# Patient Record
Sex: MALE | Race: WHITE | NOT HISPANIC OR LATINO
[De-identification: names, ages, dates, MRNs, and addresses within clinical notes are randomized per-mention and may not be internally consistent; named-entity substitution may affect disease eponyms.]

---

## 2019-11-27 ENCOUNTER — APPOINTMENT (OUTPATIENT)
Dept: UROLOGY | Facility: CLINIC | Age: 79
End: 2019-11-27
Payer: MEDICARE

## 2019-11-27 VITALS
DIASTOLIC BLOOD PRESSURE: 71 MMHG | HEART RATE: 76 BPM | HEIGHT: 67 IN | BODY MASS INDEX: 32.49 KG/M2 | WEIGHT: 207 LBS | SYSTOLIC BLOOD PRESSURE: 116 MMHG

## 2019-11-27 DIAGNOSIS — Z87.448 PERSONAL HISTORY OF OTHER DISEASES OF URINARY SYSTEM: ICD-10-CM

## 2019-11-27 DIAGNOSIS — E11.9 TYPE 2 DIABETES MELLITUS W/OUT COMPLICATIONS: ICD-10-CM

## 2019-11-27 DIAGNOSIS — Z78.9 OTHER SPECIFIED HEALTH STATUS: ICD-10-CM

## 2019-11-27 DIAGNOSIS — Z87.438 PERSONAL HISTORY OF OTHER DISEASES OF MALE GENITAL ORGANS: ICD-10-CM

## 2019-11-27 DIAGNOSIS — Z87.891 PERSONAL HISTORY OF NICOTINE DEPENDENCE: ICD-10-CM

## 2019-11-27 PROCEDURE — 99214 OFFICE O/P EST MOD 30 MIN: CPT | Mod: 25

## 2019-11-27 PROCEDURE — 51798 US URINE CAPACITY MEASURE: CPT

## 2019-11-27 RX ORDER — DIGOXIN 0.06 MG/1
TABLET ORAL
Refills: 0 | Status: ACTIVE | COMMUNITY

## 2019-11-27 RX ORDER — CANDESARTAN CILEXETIL 32 MG/1
TABLET ORAL
Refills: 0 | Status: ACTIVE | COMMUNITY

## 2019-11-27 RX ORDER — FUROSEMIDE 20 MG/1
20 TABLET ORAL
Refills: 0 | Status: ACTIVE | COMMUNITY

## 2019-11-27 RX ORDER — OMEPRAZOLE, SODIUM BICARBONATE 20; 1680 MG/1; MG/1
20-1680 POWDER, FOR SUSPENSION ORAL
Refills: 0 | Status: ACTIVE | COMMUNITY

## 2019-11-27 RX ORDER — SITAGLIPTIN 50 MG/1
50 TABLET, FILM COATED ORAL
Refills: 0 | Status: ACTIVE | COMMUNITY

## 2019-11-27 RX ORDER — GLIPIZIDE 10 MG/1
10 TABLET ORAL
Refills: 0 | Status: ACTIVE | COMMUNITY

## 2019-11-27 RX ORDER — ATORVASTATIN CALCIUM 40 MG/1
40 TABLET, FILM COATED ORAL
Refills: 0 | Status: ACTIVE | COMMUNITY

## 2019-11-27 RX ORDER — TAMSULOSIN HYDROCHLORIDE 0.4 MG/1
0.4 CAPSULE ORAL
Refills: 0 | Status: ACTIVE | COMMUNITY

## 2019-11-27 RX ORDER — FERROUS SULFATE 220 (44)/5
220 (44 FE) SOLUTION, ORAL ORAL
Refills: 0 | Status: ACTIVE | COMMUNITY

## 2019-11-27 RX ORDER — APIXABAN 5 MG/1
TABLET, FILM COATED ORAL
Refills: 0 | Status: ACTIVE | COMMUNITY

## 2019-11-27 NOTE — LETTER BODY
[Dear  ___] : Dear  [unfilled], [Consult Letter:] : I had the pleasure of evaluating your patient, [unfilled]. [Please see my note below.] : Please see my note below. [Consult Closing:] : Thank you very much for allowing me to participate in the care of this patient.  If you have any questions, please do not hesitate to contact me. [Sincerely,] : Sincerely, [FreeTextEntry3] : Andrew Torres MD

## 2019-11-27 NOTE — ASSESSMENT
[FreeTextEntry1] : Continue tamsulosin. Patient will try tadalafil, 20mg as needed. He will come back in one month - if has suboptimal response, will have a trial of intracavernosal PGE1 injection

## 2019-11-27 NOTE — REVIEW OF SYSTEMS
[Poor quality erections] : Poor quality erections [Wake up at night to urinate  How many times?  ___] : wakes up to urinate [unfilled] times during the night [Leakage of urine with urgency] : leakage of urine with urgency [Negative] : Heme/Lymph [Nocturia] : nocturia [Urine Infection (bladder/kidney)] : denies bladder/kidney infections [Pain during urination] : denies pain during urination [Pain at onset of urination] : denies pain during onset of urination [Pain after urination] : denies pain after urination [Blood in urine that you can see] : denies seeing blood in urine [Told you have blood in urine on a urine test] : denies being told that blood was present in a urine test [Discharge from urine canal] : denies discharge from urine canal [History of kidney stones] : denies history of kidney stones [Urine retention] : denies urine retention [Strong urge to urinate] : denies strong urge to urinate [Bladder pressure] : denies bladder pressure [Strain or push to urinate] : denies straining or pushing to urinate [Wait a long time to urinate] : denies waiting a long time to urinate [Slow urine stream] : denies slow urine stream [Interrupted urine stream] : denies interrupted urine stream [Bladder fullness after urinating] : denies bladder fullness after urinating [Increased pain/discomfort with bladder filling] : denies increased pain/discomfort with bladder filling [Bladder problems as child. If yes, describe..] : denies bladder problems as child

## 2019-11-27 NOTE — HISTORY OF PRESENT ILLNESS
[FreeTextEntry1] : Patient is without new voiding complaints. He is voiding with a good stream, mild frequency and nocturia x2-3, but no dysuria, hematuria or incontinence. He is reporting severe erectile dysfunction despite normal libido. He tried Viagra many years ago without significant response

## 2019-11-27 NOTE — PHYSICAL EXAM
[General Appearance - Well Developed] : well developed [General Appearance - Well Nourished] : well nourished [Normal Appearance] : normal appearance [Well Groomed] : well groomed [General Appearance - In No Acute Distress] : no acute distress [Abdomen Soft] : soft [Abdomen Tenderness] : non-tender [Costovertebral Angle Tenderness] : no ~M costovertebral angle tenderness [Urethral Meatus] : meatus normal [Penis Abnormality] : normal uncircumcised penis [Urinary Bladder Findings] : the bladder was normal on palpation [Scrotum] : the scrotum was normal [Testes Mass (___cm)] : there were no testicular masses [No Prostate Nodules] : no prostate nodules [Prostate Size ___ (0-4)] : prostate size [unfilled] (scale: 0-4) [Edema] : no peripheral edema [] : no respiratory distress [Respiration, Rhythm And Depth] : normal respiratory rhythm and effort [Exaggerated Use Of Accessory Muscles For Inspiration] : no accessory muscle use [Oriented To Time, Place, And Person] : oriented to person, place, and time [Affect] : the affect was normal [Mood] : the mood was normal [Not Anxious] : not anxious [Normal Station and Gait] : the gait and station were normal for the patient's age [No Focal Deficits] : no focal deficits [No Palpable Adenopathy] : no palpable adenopathy

## 2019-12-23 ENCOUNTER — APPOINTMENT (OUTPATIENT)
Dept: UROLOGY | Facility: CLINIC | Age: 79
End: 2019-12-23
Payer: MEDICARE

## 2019-12-23 VITALS — SYSTOLIC BLOOD PRESSURE: 145 MMHG | DIASTOLIC BLOOD PRESSURE: 76 MMHG | HEART RATE: 98 BPM

## 2019-12-23 DIAGNOSIS — N40.1 BENIGN PROSTATIC HYPERPLASIA WITH LOWER URINARY TRACT SYMPMS: ICD-10-CM

## 2019-12-23 DIAGNOSIS — N52.02 CORPORO-VENOUS OCCLUSIVE ERECTILE DYSFUNCTION: ICD-10-CM

## 2019-12-23 PROCEDURE — 99213 OFFICE O/P EST LOW 20 MIN: CPT | Mod: 25

## 2019-12-23 PROCEDURE — 54235 NJX CORPORA CAVERNOSA RX AGT: CPT

## 2019-12-23 NOTE — REVIEW OF SYSTEMS
[Wake up at night to urinate  How many times?  ___] : wakes up to urinate [unfilled] times during the night [Leakage of urine with urgency] : leakage of urine with urgency [Negative] : Heme/Lymph [Urine Infection (bladder/kidney)] : denies bladder/kidney infections [Pain at onset of urination] : denies pain during onset of urination [Pain after urination] : denies pain after urination [Pain during urination] : denies pain during urination [Blood in urine that you can see] : denies seeing blood in urine [Told you have blood in urine on a urine test] : denies being told that blood was present in a urine test [History of kidney stones] : denies history of kidney stones [Discharge from urine canal] : denies discharge from urine canal [Urine retention] : denies urine retention [Strong urge to urinate] : denies strong urge to urinate [Bladder pressure] : denies bladder pressure [Strain or push to urinate] : denies straining or pushing to urinate [Wait a long time to urinate] : denies waiting a long time to urinate [Interrupted urine stream] : denies interrupted urine stream [Slow urine stream] : denies slow urine stream [Bladder fullness after urinating] : denies bladder fullness after urinating [Increased pain/discomfort with bladder filling] : denies increased pain/discomfort with bladder filling [Leakage of urine with straining, coughing, laughing] : denies leakage of urine with straining, coughing, and/or laughing [Bladder problems as child. If yes, describe..] : denies bladder problems as child [Unaware of when urine is leaking] : aware of when urine is leaking

## 2019-12-23 NOTE — HISTORY OF PRESENT ILLNESS
[FreeTextEntry1] : Patient is without new voiding complaints. He had minimal response to tadalafil and presents for trial of penile injection

## 2019-12-23 NOTE — PHYSICAL EXAM
[General Appearance - Well Nourished] : well nourished [General Appearance - Well Developed] : well developed [Normal Appearance] : normal appearance [General Appearance - In No Acute Distress] : no acute distress [Well Groomed] : well groomed [Abdomen Tenderness] : non-tender [Abdomen Soft] : soft [Costovertebral Angle Tenderness] : no ~M costovertebral angle tenderness [Penis Abnormality] : normal uncircumcised penis [Urethral Meatus] : meatus normal [Urinary Bladder Findings] : the bladder was normal on palpation [Scrotum] : the scrotum was normal [Testes Mass (___cm)] : there were no testicular masses [No Prostate Nodules] : no prostate nodules [Edema] : no peripheral edema [] : no respiratory distress [Respiration, Rhythm And Depth] : normal respiratory rhythm and effort [Exaggerated Use Of Accessory Muscles For Inspiration] : no accessory muscle use [Oriented To Time, Place, And Person] : oriented to person, place, and time [Affect] : the affect was normal [Mood] : the mood was normal [Not Anxious] : not anxious [Normal Station and Gait] : the gait and station were normal for the patient's age [No Focal Deficits] : no focal deficits [No Palpable Adenopathy] : no palpable adenopathy

## 2019-12-23 NOTE — ASSESSMENT
[FreeTextEntry1] : PGE1, 20mcg, injected intracorporally with prompt tumescence and partial rigidity at 5 minutes.  Patient will call on 12/27/19 with report on complete response to penile injection (he was advised to come back or present to an emergency room if erection persists for more than 4 hours) - if he is satisfied, he will go on self-injection protocol.

## 2019-12-30 ENCOUNTER — MEDICATION RENEWAL (OUTPATIENT)
Age: 79
End: 2019-12-30

## 2019-12-30 RX ORDER — TADALAFIL 20 MG/1
20 TABLET ORAL
Qty: 6 | Refills: 6 | Status: ACTIVE | COMMUNITY
Start: 2019-11-27 | End: 1900-01-01

## 2019-12-31 RX ORDER — ALPROSTADIL 20 UG/ML
20 INJECTION, POWDER, LYOPHILIZED, FOR SOLUTION INTRACAVERNOUS
Qty: 6 | Refills: 5 | Status: ACTIVE | COMMUNITY
Start: 2019-12-31 | End: 1900-01-01

## 2020-01-18 ENCOUNTER — CLINICAL ADVICE (OUTPATIENT)
Age: 80
End: 2020-01-18

## 2023-12-13 ENCOUNTER — NON-APPOINTMENT (OUTPATIENT)
Age: 83
End: 2023-12-13

## 2024-04-02 ENCOUNTER — NON-APPOINTMENT (OUTPATIENT)
Age: 84
End: 2024-04-02

## 2024-04-03 DIAGNOSIS — M79.672 PAIN IN RIGHT FOOT: ICD-10-CM

## 2024-04-03 DIAGNOSIS — Z86.79 PERSONAL HISTORY OF OTHER DISEASES OF THE CIRCULATORY SYSTEM: ICD-10-CM

## 2024-04-03 DIAGNOSIS — Z86.39 PERSONAL HISTORY OF OTHER ENDOCRINE, NUTRITIONAL AND METABOLIC DISEASE: ICD-10-CM

## 2024-04-03 DIAGNOSIS — M79.675 PAIN IN LEFT TOE(S): ICD-10-CM

## 2024-04-03 DIAGNOSIS — M79.671 PAIN IN RIGHT FOOT: ICD-10-CM

## 2024-04-03 DIAGNOSIS — L84 CORNS AND CALLOSITIES: ICD-10-CM

## 2024-04-26 ENCOUNTER — APPOINTMENT (OUTPATIENT)
Dept: PODIATRY | Facility: CLINIC | Age: 84
End: 2024-04-26
Payer: MEDICARE

## 2024-04-26 DIAGNOSIS — L60.0 INGROWING NAIL: ICD-10-CM

## 2024-04-26 DIAGNOSIS — M79.674 PAIN IN RIGHT TOE(S): ICD-10-CM

## 2024-04-26 PROCEDURE — 99212 OFFICE O/P EST SF 10 MIN: CPT | Mod: 25

## 2024-04-26 PROCEDURE — 11765 WEDGE EXCISION SKN NAIL FOLD: CPT

## 2024-04-26 NOTE — PROCEDURE
[FreeTextEntry1] :  Examination.  ([id number]).  We had a lengthy discussion concerning the patient's condition.  Slant back I & D was performed removing the offending spicule.  Thick, hard, hyperkeratotic tissue with nail fold overgrowth was removed.  The area was dressed with Betadine, Neosporin and a dry dressing.  Patient expressed relief after the procedure.  We discussed the risk of recurrence and a possible P & A in the future. Patient to return: 1month

## 2024-04-26 NOTE — HISTORY OF PRESENT ILLNESS
[FreeTextEntry1] : Nico is an 83-year-old male who walked in complaining of pain in his toe.  It started yesterday.  He is concerned he has an infection.  He is diabetic.

## 2024-04-26 NOTE — PHYSICAL EXAM
[FreeTextEntry3] :  Vascular Exam: DP Pulse (left): Absent. DP Pulse (right): Absent. PT Pulse (left): Absent. PT Pulse (right): Absent. Capillary Return - L: Delayed. Capillary Return - R: Delayed. Temperature Grad - L: Warm to Cool. Temperature Grad - R: Warm to Cool. [de-identified] : Orthopedic Exam:  Patient presents with mild bunion formation and hammertoe contractures of the lesser toes.  No discomfort upon exam at this time.. [FreeTextEntry1] :  Achilles reflex absent, Patella reflex absent, sharp dull absent, light touch/pressure absent, hot/cold absent, vibratory absent, [place] hallux, 5th digit, 3rd met head, heel absent.

## 2024-06-07 ENCOUNTER — APPOINTMENT (OUTPATIENT)
Dept: PODIATRY | Facility: CLINIC | Age: 84
End: 2024-06-07
Payer: MEDICARE

## 2024-06-07 ENCOUNTER — TRANSCRIPTION ENCOUNTER (OUTPATIENT)
Age: 84
End: 2024-06-07

## 2024-06-07 DIAGNOSIS — B35.1 TINEA UNGUIUM: ICD-10-CM

## 2024-06-07 DIAGNOSIS — I70.203 UNSPECIFIED ATHEROSCLEROSIS OF NATIVE ARTERIES OF EXTREMITIES, BILATERAL LEGS: ICD-10-CM

## 2024-06-07 PROCEDURE — 11721 DEBRIDE NAIL 6 OR MORE: CPT | Mod: 59,Q8

## 2024-06-07 PROCEDURE — 11056 PARNG/CUTG B9 HYPRKR LES 2-4: CPT | Mod: Q8

## 2024-06-07 NOTE — HISTORY OF PRESENT ILLNESS
[FreeTextEntry1] : Nico is an 83-year-old male who presents for continued diabetic foot care.  He complains of calluses, toenails that are difficult to cut, and long toenails.  The patient is a diabetic under the care of Tony Hightower. Callus Pain: Subject to breakdown. Mycotic Nail Pain: On palpation. Non-Dystrophic Nail Pain: None. DLS by Referring doctor: 03/05/24.

## 2024-06-07 NOTE — PHYSICAL EXAM
[FreeTextEntry3] : Class findings (Q8) indicated due to abs L posterior tibial pulse, abs R posterior tibial pulse, abs L dorsalis pedis pulse, abs R dorsalis pedis pulse, hair growth decreased or absent, nail changes (thickening), pigmentary changes (discoloration), skin texture L (thin, shiny), skin texture R (thin, shiny) and temperature changes.  Skin intact, no infection.   [FreeTextEntry1] : Dermatological Exam:   Callus located on his plantar medial right forefoot, right 1st toe plantar aspect, plantar medial left forefoot and left 1st toe plantar aspect, thickened, hard and dry. Pain: subject to breakdown. There are 6 mycotic nails located on right 1st toenail, right 2nd toenail, right 5th toenail, left 1st toenail, left 2nd toenail and left 5th toenail, brittle, crumbly, discolored and thickened. Pain: On palpation. There are 4 non dystrophic nails located on right 3rd toenail, right 4th toenail, left 3rd toenail and left 4th toenail, elongated. Pain: None. Skin intact, no signs of infection.

## 2024-06-07 NOTE — PROCEDURE
[FreeTextEntry1] : Plan:  Plantar medial right forefoot, right 1st toe plantar aspect, plantar medial left forefoot and left 1st toe plantar aspect.  Keratotic lesions were debrided. ([id number]).  Right 1st toenail, right 2nd toenail, right 5th toenail, left 1st toenail, left 2nd toenail and left 5th toenail.  Exam.  Fungal nails were debrided using manual cutters and electrical  to patient tolerance. ([id number]). Right 3rd toenail, right 4th toenail, left 3rd toenail and left 4th toenail - elongated nails were trimmed  Patient to return: 9 Weeks

## 2024-06-07 NOTE — REVIEW OF SYSTEMS
[Negative] : Constitutional [FreeTextEntry5] : PVD [de-identified] : callus, thick toenails, long toenails [de-identified] : Neuropathy

## 2024-08-16 ENCOUNTER — APPOINTMENT (OUTPATIENT)
Dept: PODIATRY | Facility: CLINIC | Age: 84
End: 2024-08-16

## 2024-08-23 ENCOUNTER — APPOINTMENT (OUTPATIENT)
Dept: PODIATRY | Facility: CLINIC | Age: 84
End: 2024-08-23
Payer: MEDICARE

## 2024-08-23 DIAGNOSIS — B35.1 TINEA UNGUIUM: ICD-10-CM

## 2024-08-23 DIAGNOSIS — I70.203 UNSPECIFIED ATHEROSCLEROSIS OF NATIVE ARTERIES OF EXTREMITIES, BILATERAL LEGS: ICD-10-CM

## 2024-08-23 PROCEDURE — 11721 DEBRIDE NAIL 6 OR MORE: CPT | Mod: Q8,59

## 2024-08-23 PROCEDURE — 11056 PARNG/CUTG B9 HYPRKR LES 2-4: CPT | Mod: Q8

## 2024-08-23 NOTE — PROCEDURE
[FreeTextEntry1] : Plan:  Exam.  Plantar medial right forefoot, right 1st toe plantar aspect, plantar medial left forefoot and left 1st toe plantar aspect.  Keratotic lesions were debrided. ([id number]).  Right 1st toenail, right 2nd toenail, right 5th toenail, left 1st toenail, left 2nd toenail and left 5th toenail.  Exam.  Fungal nails were debrided using manual cutters and electrical  to patient tolerance. ([id number]). Right 3rd toenail, right 4th toenail, left 3rd toenail and left 4th toenail - elongated nails were trimmed. Patient to return: 9 Weeks

## 2024-08-23 NOTE — REVIEW OF SYSTEMS
[Negative] : Constitutional [FreeTextEntry5] : PVD [de-identified] : callus, thick toenails, long toenails [de-identified] : Neuropathy

## 2024-08-23 NOTE — PHYSICAL EXAM
[FreeTextEntry3] : Class findings (Q8) indicated due to abs L posterior tibial pulse, abs R posterior tibial pulse, abs L dorsalis pedis pulse, abs R dorsalis pedis pulse, hair growth decreased or absent, nail changes (thickening), pigmentary changes (discoloration), skin texture L (thin, shiny), skin texture R (thin, shiny) and temperature changes.  Skin intact, no infection.   [FreeTextEntry1] : Dermatological Exam:   Callus located on his plantar medial right forefoot, right 1st toe plantar aspect, plantar medial left forefoot and left 1st toe plantar aspect, thickened, hard and dry. Pain: subject to breakdown. There are 6 mycotic nails located on right 1st toenail, right 2nd toenail, right 5th toenail, left 1st toenail, left 2nd toenail and left 5th toenail, brittle, crumbly, discolored and thickened. Pain: On palpation. There are 4 non dystrophic nails located on right 3rd toenail, right 4th toenail, left 3rd toenail and left 4th toenail, elongated. Pain: None. Skin intact, no signs of bacterial infection.

## 2024-08-23 NOTE — HISTORY OF PRESENT ILLNESS
[FreeTextEntry1] : Nico is an 83-year-old male who presents to the office for continued diabetic foot care.  He complains of calluses, toenails that are difficult to cut, and long toenails.  The patient is a diabetic under the care of Tony Hightower. Callus Pain: Subject to breakdown. Mycotic Nail Pain: On palpation. Non-Dystrophic Nail Pain: None. DLS by Referring doctor: 06/07/24.

## 2024-10-25 ENCOUNTER — APPOINTMENT (OUTPATIENT)
Dept: PODIATRY | Facility: CLINIC | Age: 84
End: 2024-10-25
Payer: MEDICARE

## 2024-10-25 DIAGNOSIS — B35.1 TINEA UNGUIUM: ICD-10-CM

## 2024-10-25 DIAGNOSIS — I70.203 UNSPECIFIED ATHEROSCLEROSIS OF NATIVE ARTERIES OF EXTREMITIES, BILATERAL LEGS: ICD-10-CM

## 2024-10-25 PROCEDURE — 11721 DEBRIDE NAIL 6 OR MORE: CPT | Mod: 59,Q8

## 2024-10-25 PROCEDURE — 11056 PARNG/CUTG B9 HYPRKR LES 2-4: CPT | Mod: Q8

## 2024-12-19 ENCOUNTER — NON-APPOINTMENT (OUTPATIENT)
Age: 84
End: 2024-12-19

## 2025-01-15 ENCOUNTER — APPOINTMENT (OUTPATIENT)
Dept: PODIATRY | Facility: CLINIC | Age: 85
End: 2025-01-15
Payer: MEDICARE

## 2025-01-15 ENCOUNTER — NON-APPOINTMENT (OUTPATIENT)
Age: 85
End: 2025-01-15

## 2025-01-15 DIAGNOSIS — B35.1 TINEA UNGUIUM: ICD-10-CM

## 2025-01-15 DIAGNOSIS — I70.203 UNSPECIFIED ATHEROSCLEROSIS OF NATIVE ARTERIES OF EXTREMITIES, BILATERAL LEGS: ICD-10-CM

## 2025-01-15 PROCEDURE — 11720 DEBRIDE NAIL 1-5: CPT | Mod: 59,Q8

## 2025-01-15 PROCEDURE — 11719 TRIM NAIL(S) ANY NUMBER: CPT | Mod: Q8

## 2025-01-15 PROCEDURE — 11056 PARNG/CUTG B9 HYPRKR LES 2-4: CPT | Mod: Q8

## 2025-03-26 ENCOUNTER — APPOINTMENT (OUTPATIENT)
Dept: PODIATRY | Facility: CLINIC | Age: 85
End: 2025-03-26